# Patient Record
Sex: FEMALE | Race: OTHER | HISPANIC OR LATINO | Employment: FULL TIME | URBAN - METROPOLITAN AREA
[De-identification: names, ages, dates, MRNs, and addresses within clinical notes are randomized per-mention and may not be internally consistent; named-entity substitution may affect disease eponyms.]

---

## 2018-11-29 ENCOUNTER — HOSPITAL ENCOUNTER (EMERGENCY)
Facility: HOSPITAL | Age: 23
Discharge: HOME | End: 2018-11-29
Attending: EMERGENCY MEDICINE

## 2018-11-29 VITALS
WEIGHT: 177.38 LBS | RESPIRATION RATE: 14 BRPM | DIASTOLIC BLOOD PRESSURE: 71 MMHG | HEIGHT: 65 IN | HEART RATE: 80 BPM | TEMPERATURE: 98 F | BODY MASS INDEX: 29.55 KG/M2 | SYSTOLIC BLOOD PRESSURE: 116 MMHG | OXYGEN SATURATION: 100 %

## 2018-11-29 DIAGNOSIS — M25.50 JOINT PAIN: Primary | ICD-10-CM

## 2018-11-29 DIAGNOSIS — R20.9 COLD SENSATION OF SKIN: ICD-10-CM

## 2018-11-29 PROCEDURE — 99283 EMERGENCY DEPT VISIT LOW MDM: CPT

## 2018-11-29 RX ORDER — ACETAMINOPHEN 325 MG/1
650 TABLET ORAL ONCE
Status: COMPLETED | OUTPATIENT
Start: 2018-11-29 | End: 2018-11-29

## 2018-11-29 RX ORDER — NAPROXEN 500 MG/1
500 TABLET ORAL 2 TIMES DAILY WITH MEALS
Qty: 30 TABLET | Refills: 0 | Status: SHIPPED | OUTPATIENT
Start: 2018-11-29

## 2018-11-29 RX ORDER — BACLOFEN 10 MG/1
10 TABLET ORAL 3 TIMES DAILY
Qty: 15 TABLET | Refills: 0 | Status: SHIPPED | OUTPATIENT
Start: 2018-11-29

## 2018-11-29 RX ORDER — IBUPROFEN 600 MG/1
600 TABLET ORAL ONCE
Status: COMPLETED | OUTPATIENT
Start: 2018-11-29 | End: 2018-11-29

## 2018-11-29 RX ADMIN — IBUPROFEN 600 MG: 600 TABLET ORAL at 21:27

## 2018-11-29 RX ADMIN — ACETAMINOPHEN 650 MG: 325 TABLET ORAL at 21:27

## 2018-11-30 NOTE — ED PROVIDER NOTES
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  Patient reports that she has been having the same the freezer fist long she has bilateral arm pain and numbness  She states that she has 'pain all the time and it constantly moves'  Patient is unable to localize pain at this time  She reports she has only been using topical ointments that help with arthritis at home  She denies any tingling, trauma or injury  None       History reviewed  No pertinent past medical history  Past Surgical History:   Procedure Laterality Date    TONSILLECTOMY         History reviewed  No pertinent family history  I have reviewed and agree with the history as documented  Social History   Substance Use Topics    Smoking status: Never Smoker    Smokeless tobacco: Never Used    Alcohol use No        Review of Systems   All other systems reviewed and are negative  Physical Exam  Physical Exam   Constitutional: She is oriented to person, place, and time  She appears well-developed and well-nourished  No distress  Pt bundled up in jacket and gloves at bedside   HENT:   Head: Normocephalic and atraumatic  Eyes: Conjunctivae are normal    EOM grossly intact   Neck: Normal range of motion  Neck supple  No JVD present  No midline or paraspinal neck tenderness to palpation   Cardiovascular: Normal rate  Pulmonary/Chest: Effort normal    Abdominal: Soft  Musculoskeletal:   FROM, steady gait, cap refill brisk, strength and sensation intact throughout UE b/l   Lymphadenopathy:     She has no cervical adenopathy  Neurological: She is alert and oriented to person, place, and time  No cranial nerve deficit or sensory deficit  She exhibits normal muscle tone  Skin: Skin is warm and dry  Capillary refill takes less than 2 seconds  Psychiatric: She has a normal mood and affect  Her behavior is normal    Nursing note and vitals reviewed        Vital Signs  ED Triage Vitals   Temperature Pulse Respirations Blood Pressure SpO2   11/29/18 2100 11/29/18 2100 11/29/18 2100 11/29/18 2100 11/29/18 2100   98 °F (36 7 °C) 80 14 116/71 100 %      Temp Source Heart Rate Source Patient Position - Orthostatic VS BP Location FiO2 (%)   11/29/18 2100 11/29/18 2100 11/29/18 2100 11/29/18 2100 --   Tympanic Monitor Sitting Left arm       Pain Score       11/29/18 2123       9           Vitals:    11/29/18 2100   BP: 116/71   Pulse: 80   Patient Position - Orthostatic VS: Sitting       Visual Acuity      ED Medications  Medications   ibuprofen (MOTRIN) tablet 600 mg (600 mg Oral Given 11/29/18 2127)   acetaminophen (TYLENOL) tablet 650 mg (650 mg Oral Given 11/29/18 2127)       Diagnostic Studies  Results Reviewed     None                 No orders to display              Procedures  Procedures       Phone Contacts  ED Phone Contact    ED Course  ED Course as of Nov 29 2258   Thu Nov 29, 2018 2130 Offered pt warm blanket    2141 Pt is wanting an exact answer about what is causing her pain, I explained to pt that the pian started when she had 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 and that is likely waht is causing her pain, I told her motrin and tylenol best tmt for joint pain, will give info for ortho f/u                                MDM  Number of Diagnoses or Management Options  Cold sensation of skin:   Joint pain:   Diagnosis management comments: 31-year-old female presenting with bilateral upper 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, long conversation with pt that the best way to decrease pain would be to cut down hours or change occupation, I believe that the cold is exacerbating the joint pain, pt has no other injuries otherwise, she appears well, no focal neurological deficits, strength and sensation intact, f/u with pcp and ortho outpatient    strict return to ED precautions discussed  Pt verbalizes understanding and agrees with plan  Pt is stable for discharge    Portions of the record may have been created with voice recognition software  Occasional wrong word or "sound a like" substitutions may have occurred due to the inherent limitations of voice recognition software  Read the chart carefully and recognize, using context, where substitutions have occurred  CritCare Time    Disposition  Final diagnoses:   Joint pain   Cold sensation of skin     Time reflects when diagnosis was documented in both MDM as applicable and the Disposition within this note     Time User Action Codes Description Comment    11/29/2018  9:53 PM Margo Johnson Add [M25 50] Joint pain     11/29/2018  9:53 PM Margo Johnson Add [R20 9] Cold sensation of skin       ED Disposition     ED Disposition Condition Comment    Discharge  [facility] discharge to home/self care      Condition at discharge: Good        Follow-up Information     Follow up With Specialties Details Why Contact Info Additional Information    [facility] Schedule an appointment as soon as possible for a visit As needed [address] [place] [address]  Erick Salas [place] [id number]       [address] Orth[facility] Surgery Schedule an appointment as soon as possible for a visit As needed [facility] [id number]  [address] [place] [address], [phone]          Discharge Medication List as of 11/29/2018  9:55 PM      START taking these medications    Details   baclofen 10 mg tablet Take 1 tablet (10 mg total) by mouth 3 (three) times a day, Starting u 11/29/2018, Print naproxen (NAPROSYN) 500 mg tablet Take 1 tablet (500 mg total) by mouth 2 (two) times a day with meals, Starting Thu 11/29/2018, Print           No discharge procedures on file      ED Provider  Electronically Signed by           Zee Goodman PA-C  11/29/18 8481

## 2018-11-30 NOTE — DISCHARGE INSTRUCTIONS
Artralgia   LO QUE NECESITA SABER:   La artralgia es dolor en philippe o más articulaciones, dusty sin inflamación  El dolor podría ser de corta duración y mejorar dentro de 6 a 8 semanas  La artralgia puede ser philippe señal temprana de artritis  La artralgia puede ser causada por philippe condición médica beronica un trastorno hormonal o un tumor  Además podría ser la consecuencia de philippe infección o Elnoria Lizzeth  INSTRUCCIONES SOBRE EL [facility]:   Medicamentos:  A usted le pueden  prescribir los siguientes medicamentos:  · El acetaminofén  francisca el dolor  Pregunte la cantidad y la frecuencia con que debe tomarlos  [address]  El acetaminofén puede causar daño en el hígado cuando no se nidia de forma correcta  · AINEs (Analgésicos antiinflamatorios no esteroides)  disminuyen el dolor y previenen la inflamación  Consulte con pablo médico cuál medicamento es el adecuado para usted  Pregunte cuánto dante y cuándo  Tómelos beronica se le indique  Cuando no se zeyad de la [employer], los medicamentos antiinflamatorios no esteroides pueden causar sangrado estomacal y problemas renales  · La crema para el alivio del dolor  francisca el dolor  [address]  · [place] eron medicamentos beronica se le haya indicado  Consulte con pablo médico si usted venita que pablo medicamento no le está ayudando o si presenta efectos secundarios  Infórmele si es alérgico a algún medicamento  Mantenga philippe lista actualizada de los [place], las vitaminas y los productos herbales que nidia  Incluya los siguientes datos de los medicamentos: cantidad, frecuencia y motivo de administración  Traiga con usted la lista o los envases de la píldoras a eron citas de seguimiento  Lleve la lista de los medicamentos con usted en sarika de philippe emergencia  Jerry philippe lazarus de control con pablo médico o especialista beronica se lo indicaron:  Anote eron preguntas para que se acuerde de hacerlas monty eron visitas     Cuidados personales:   · La aplicación de calor  para ayudar a reducir el dolor  Use philippe compresa o envoltura caliente  Aplíquese calor de 20 a 30 minutos cada 2 horas monty los días que le indiquen  · Descanse  lo más posible  Evite actividades que causan [employer] articulaciones  · Aplique hielo  para ayudar a bajar la inflamación y el dolor  El hielo también puede contribuir a evitar el daño de los tejidos  Use un paquete de hielo o ponga hielo molido dentro de [employer]  Cúbrala con philippe toalla y póngasela en la articulación adolorida cada hora monty 15 o 20 minutos o según se le haya indicado  · [place]  la articulación con philippe férula o envoltura elástica según indicaciones  · Eleve  la articulación de [employer] que quede por encima del nivel de pablo corazón tan seguido beronica pueda para ayudar a reducir la inflamación y el dolor  Use almohadas o cobijas dobladas para elevar la articulación de forma cómoda  · Pierda peso  si tiene sobrepeso  El peso extra puede ejercer presión sobre eron articulaciones y causarle más dolor  Consulte con pablo médico cuánto debería pesar  También pídale que le ayude a diseñar un buen plan de pérdida de peso  · El ejercicio  con regularidad para ayudar a mejorar el movimiento de las articulaciones y disminuir el dolor  Pida más información acerca de un plan de ejercicio adecuado para usted  Los ejercicios de bajo impacto pueden ayudar a quitar algo de la presión en las articulaciones  Niya Kyler de ejercicios de bajo impacto son caminar, nadar y practicar aeróbicos acuáticos  Fisioterapia:  Un fisioterapeuta le puede enseñar ejercicios para ayudarle a mejorar el movimiento y la fuerza, y para disminuir el dolor  Pregúntele a pablo médico si la fisioterapia es apropiada para usted  Comuníquese con pablo especialista o médico sí:   · Usted tiene fiebre  · Usted continúa sintiendo dolor en las articulaciones y el dolor no se le francisca con calor, hielo o medicamento      · Usted tiene dolor e inflamación alrededor de la articulación  · Usted tiene preguntas o inquietudes acerca de seals condición o cuidado  Regrese a la mikaela de emergencias si:   · Usted de repente siente un dolor severo cuando mueve la articulación  · Usted tiene fiebre y escalofríos real  · Usted no puede  la articulación  · Usted pierde sensibilidad en el lado del cuerpo donde está la articulación adolorida  © 2017 [address] [employer] Information is for End User's use only and may not be sold, redistributed or otherwise used for commercial purposes  All illustrations and images included in CareNotes® are the copyrighted property of A D A M , Inc  or Caio Mckenzie  Esta información es sólo para uso en educación  Seals intención no es darle un consejo médico sobre enfermedades o tratamientos  Colsulte con seals Dona Hamm farmacéutico antes de seguir cualquier régimen médico para saber si es seguro y efectivo para usted

## 2018-12-06 ENCOUNTER — OFFICE VISIT (OUTPATIENT)
Dept: OBGYN CLINIC | Facility: CLINIC | Age: 23
End: 2018-12-06

## 2018-12-06 VITALS
WEIGHT: 176 LBS | SYSTOLIC BLOOD PRESSURE: 118 MMHG | BODY MASS INDEX: 29.32 KG/M2 | DIASTOLIC BLOOD PRESSURE: 78 MMHG | HEART RATE: 82 BPM | HEIGHT: 65 IN

## 2018-12-06 DIAGNOSIS — R20.0 BILATERAL HAND NUMBNESS: Primary | ICD-10-CM

## 2018-12-06 PROCEDURE — 99203 OFFICE O/P NEW LOW 30 MIN: CPT | Performed by: ORTHOPAEDIC SURGERY

## 2018-12-06 RX ORDER — ACETAMINOPHEN 325 MG/1
650 TABLET ORAL EVERY 6 HOURS PRN
COMMUNITY

## 2018-12-06 NOTE — PROGRESS NOTES
Assessment:     1  Bilateral hand numbness        Plan:     Problem List Items Addressed This Visit        Other    Bilateral hand numbness - Primary     Findings consistent with bilateral hand numbness and pain, suspect carpal tunnel syndrome  Discussed findings and treatment options with the patient  I think patient's symptom is most likely related to repetitive use of her hand for prolonged period time while at work  I advised patient to avoid repetitive activity with her hands  Provided her a prescription for obtaining cock-up wrist brace to wear at nighttime and also during the day as needed  I recommend patient to limit her work environment to normal temperature setting  I will see patient back in 4 weeks and if she continued to have symptoms we will need to check EMG  All patient's questions were answered to her satisfaction  This note is created using dictation transcription  It may contain typographical errors, grammatical errors, improperly dictated words, background noise and other errors  Relevant Orders    Ambulatory referral to Occupational Therapy         Subjective:     Patient ID: Cristy Homans is a 21 y o  female  Chief Complaint:  20 yo  female with 2 weeks duration of bilateral hand numbness and pain  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  She started experiencing pain and numbness in her fingers  She also developed weakness with her hand in gripping  She has pain and nighttime and often she had to shake her hand to make the fingers feel better  She is dropping objects frequently  The numbness is mostly localized over the index, middle, and ring fingers  She also does have some numbness along the inner forearm  She denies injury  She denied neck pain  Information on patient's intake form was reviewed        Allergy:  No Known Allergies  Medications:  all current active meds have been reviewed  Past Medical History:  History reviewed  No pertinent past medical history  Past Surgical History:  Past Surgical History:   Procedure Laterality Date    TONSILLECTOMY       Family History:  Family History   Problem Relation Age of Onset    Hypertension Mother     Asthma Brother      Social History:  History   Alcohol Use No     History   Drug Use No     History   Smoking Status    Never Smoker   Smokeless Tobacco    Never Used     Review of Systems   HENT: Negative  Eyes: Negative  Respiratory: Negative  Cardiovascular: Negative  Gastrointestinal: Negative  Endocrine: Negative  Genitourinary: Negative  Musculoskeletal: Positive for arthralgias (Bilateral hand)  Negative for neck pain  Skin: Negative  Allergic/Immunologic: Negative  Neurological: Positive for weakness (Bilateral hand) and numbness (Bilateral fingers)  Hematological: Negative  Psychiatric/Behavioral: Negative  Objective:  BP Readings from Last 1 Encounters:   12/06/18 118/78      Wt Readings from Last 1 Encounters:   12/06/18 79 8 kg (176 lb)      BMI:   Estimated body mass index is 29 29 kg/m² as calculated from the following:    Height as of this encounter: 5' 5" (1 651 m)  Weight as of this encounter: 79 8 kg (176 lb)  BSA:   Estimated body surface area is 1 87 meters squared as calculated from the following:    Height as of this encounter: 5' 5" (1 651 m)  Weight as of this encounter: 79 8 kg (176 lb)  Physical Exam   Constitutional: She is oriented to person, place, and time  She appears well-developed  HENT:   Head: Normocephalic and atraumatic  Eyes: Conjunctivae and EOM are normal    Neck: Neck supple  Neurological: She is alert and oriented to person, place, and time  Skin: Skin is warm  Psychiatric: She has a normal mood and affect  Nursing note and vitals reviewed  Right Hand Exam     Tenderness   The patient is experiencing no tenderness           Range of Motion   The patient has normal right wrist ROM  Muscle Strength   Wrist Extension: 5/5   Wrist Flexion: 5/5   : 4/5     Tests   Tinels Sign (Medial Nerve): positive  Finkelstein: negative    Other   Erythema: absent  Sensation: decreased (Index, middle, and ring fingers)  Pulse: present      Left Hand Exam     Tenderness   The patient is experiencing no tenderness  Range of Motion   The patient has normal left wrist ROM      Muscle Strength   Wrist Extension: 5/5   Wrist Flexion: 5/5   :  4/5     Tests   Phalens Sign: negative  Tinels Sign (Medial Nerve): positive  Finkelstein: negative    Other   Erythema: absent  Sensation: decreased (Index, middle, and ring fingers)  Pulse: present

## 2018-12-06 NOTE — LETTER
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

## 2018-12-06 NOTE — ASSESSMENT & PLAN NOTE
Findings consistent with bilateral hand numbness and pain, suspect carpal tunnel syndrome  Discussed findings and treatment options with the patient  I think patient's symptom is most likely related to repetitive use of her hand for prolonged period time while at work  I advised patient to avoid repetitive activity with her hands  Provided her a prescription for obtaining cock-up wrist brace to wear at nighttime and also during the day as needed  I recommend patient to limit her work environment to normal temperature setting  I will see patient back in 4 weeks and if she continued to have symptoms we will need to check EMG  All patient's questions were answered to her satisfaction  This note is created using dictation transcription  It may contain typographical errors, grammatical errors, improperly dictated words, background noise and other errors